# Patient Record
Sex: FEMALE | Race: WHITE | NOT HISPANIC OR LATINO
[De-identification: names, ages, dates, MRNs, and addresses within clinical notes are randomized per-mention and may not be internally consistent; named-entity substitution may affect disease eponyms.]

---

## 2021-12-28 PROBLEM — Z00.00 ENCOUNTER FOR PREVENTIVE HEALTH EXAMINATION: Status: ACTIVE | Noted: 2021-12-28

## 2021-12-30 ENCOUNTER — APPOINTMENT (OUTPATIENT)
Dept: BREAST CENTER | Facility: CLINIC | Age: 71
End: 2021-12-30
Payer: MEDICARE

## 2021-12-30 VITALS
RESPIRATION RATE: 65 BRPM | SYSTOLIC BLOOD PRESSURE: 146 MMHG | BODY MASS INDEX: 29.02 KG/M2 | DIASTOLIC BLOOD PRESSURE: 83 MMHG | HEIGHT: 64 IN | WEIGHT: 170 LBS

## 2021-12-30 DIAGNOSIS — Z72.3 LACK OF PHYSICAL EXERCISE: ICD-10-CM

## 2021-12-30 PROCEDURE — 99203 OFFICE O/P NEW LOW 30 MIN: CPT

## 2021-12-30 RX ORDER — HYDROCHLOROTHIAZIDE 25 MG/1
25 TABLET ORAL
Refills: 0 | Status: ACTIVE | COMMUNITY

## 2021-12-30 RX ORDER — METOPROLOL SUCCINATE 50 MG/1
50 TABLET, EXTENDED RELEASE ORAL
Refills: 0 | Status: ACTIVE | COMMUNITY

## 2021-12-30 RX ORDER — ATORVASTATIN CALCIUM 20 MG/1
20 TABLET, FILM COATED ORAL
Refills: 0 | Status: ACTIVE | COMMUNITY

## 2021-12-30 NOTE — PAST MEDICAL HISTORY
[Menarche Age ____] : age at menarche was [unfilled] [Menopause Age____] : age at menopause was [unfilled] [Total Preg ___] : G[unfilled] [Live Births ___] : P[unfilled]  [Living ___] : Living: [unfilled] [Age At Live Birth ___] : Age at live birth: [unfilled] [FreeTextEntry7] : No [FreeTextEntry8] : Yes

## 2021-12-30 NOTE — HISTORY OF PRESENT ILLNESS
[FreeTextEntry1] : Pt is a 71 year old Malaysian female referred for consultation by Dr. Isaiah Brown for abnormal mmg.\par Pt denies any breast lesions, discharge or masses.\par Here w/ her Daughter - Niya\par Pt is Malaysian. \par \par 12/14/21, Geoff SALGADO sMMG: het dense, cluster of microcalcs, ? branching, approx 12:00 position middle or posterior depth, rec add imaging, BR0\par 12/23/21, HUMA SALGADO dMMG: het dense, localizable to approx 12:00 mid depth calcs susp for malignancy, bx rec, BR4\par \par Fhx: Mom w/ ? reproductive Ca - poss ovarian?, 2 mCousins w/ Ovarian Ca, and Father w/ Leukemia at 90. \par

## 2021-12-30 NOTE — ASSESSMENT
[FreeTextEntry1] : Pt is a 71 year old Citizen of [place] female referred for consultation by Dr. Isaiah Brown for abnormal mmg.\par Pt denies any breast lesions, discharge or masses.\par Here w/ her Daughter - Niya\par Pt is Citizen of [place]. \par \par 12/14/21, NAOMI, Bilat sMMG: het dense, cluster of microcalcs, ? branching, approx 12:00 position middle or posterior depth, rec add imaging, BR0\par 12/23/21, NAOMI, R dMMG: het dense, localizable to approx 12:00 mid depth calcs susp for malignancy, bx rec, BR4\par \par Fhx: Mom w/ ? reproductive Ca - poss ovarian?, 2 mCousins w/ Ovarian Ca, and Father w/ Leukemia at 90. \par CBE: D cup, pendulous breasts. Left UOQ diffusely denser then on right (may be dense breast tissue but will get u/s). No axillary or SC lymphadenopathy.\par Reviewed results of mmg, rec is for review and probably stereobx. Also rec bilat u/s given dense breasts and UOQ left diffusely denser. Possible MRI and genetic testing pending results. Pt to clarify fhx. \par

## 2021-12-30 NOTE — PHYSICAL EXAM
[Normocephalic] : normocephalic [Atraumatic] : atraumatic [Supple] : supple [No Supraclavicular Adenopathy] : no supraclavicular adenopathy [No Thyromegaly] : no thyromegaly [Examined in the supine and seated position] : examined in the supine and seated position [Bra Size: ___] : Bra Size: [unfilled] [No dominant masses] : no dominant masses in right breast  [No dominant masses] : no dominant masses left breast [No Nipple Retraction] : no left nipple retraction [No Nipple Discharge] : no left nipple discharge [No Axillary Lymphadenopathy] : no left axillary lymphadenopathy [No Edema] : no edema [No Swelling] : no swelling [Full ROM] : full range of motion [No Rashes] : no rashes [No Ulceration] : no ulceration [Grade 3] : Ptosis Grade 3 [de-identified] : Grooves on shoulders

## 2021-12-30 NOTE — DATA REVIEWED
[FreeTextEntry1] : 12/14/21, Geoff SALGADO sMMG: het dense, cluster of microcalcs, ? branching, approx 12:00 position middle or posterior depth, rec add imaging, BR0\par 12/23/21, HUMA SALGADO dMMG: het dense, localizable to approx 12:00 mid depth calcs susp for malignancy, bx rec, BR4

## 2021-12-30 NOTE — CONSULT LETTER
[Dear  ___] : Dear  [unfilled], [Consult Letter:] : I had the pleasure of evaluating your patient, [unfilled]. [Please see my note below.] : Please see my note below. [Consult Closing:] : Thank you very much for allowing me to participate in the care of this patient.  If you have any questions, please do not hesitate to contact me. [Sincerely,] : Sincerely, [FreeTextEntry3] : Elizabet Howard MD

## 2022-01-05 ENCOUNTER — NON-APPOINTMENT (OUTPATIENT)
Age: 72
End: 2022-01-05

## 2022-01-06 ENCOUNTER — RESULT REVIEW (OUTPATIENT)
Age: 72
End: 2022-01-06

## 2022-01-06 ENCOUNTER — APPOINTMENT (OUTPATIENT)
Dept: ULTRASOUND IMAGING | Facility: CLINIC | Age: 72
End: 2022-01-06
Payer: MEDICARE

## 2022-01-06 PROCEDURE — 76641 ULTRASOUND BREAST COMPLETE: CPT | Mod: 50

## 2022-01-18 ENCOUNTER — APPOINTMENT (OUTPATIENT)
Dept: MAMMOGRAPHY | Facility: CLINIC | Age: 72
End: 2022-01-18
Payer: MEDICARE

## 2022-01-18 ENCOUNTER — RESULT REVIEW (OUTPATIENT)
Age: 72
End: 2022-01-18

## 2022-01-18 ENCOUNTER — OUTPATIENT (OUTPATIENT)
Dept: OUTPATIENT SERVICES | Facility: HOSPITAL | Age: 72
LOS: 1 days | End: 2022-01-18
Payer: MEDICARE

## 2022-01-18 DIAGNOSIS — R92.8 OTHER ABNORMAL AND INCONCLUSIVE FINDINGS ON DIAGNOSTIC IMAGING OF BREAST: ICD-10-CM

## 2022-01-18 DIAGNOSIS — R92.2 INCONCLUSIVE MAMMOGRAM: ICD-10-CM

## 2022-01-18 PROCEDURE — 19081 BX BREAST 1ST LESION STRTCTC: CPT | Mod: RT

## 2022-01-18 PROCEDURE — 77065 DX MAMMO INCL CAD UNI: CPT

## 2022-01-18 PROCEDURE — A4648: CPT

## 2022-01-18 PROCEDURE — 77065 DX MAMMO INCL CAD UNI: CPT | Mod: 26,RT

## 2022-01-18 PROCEDURE — 19081 BX BREAST 1ST LESION STRTCTC: CPT

## 2022-01-25 ENCOUNTER — APPOINTMENT (OUTPATIENT)
Dept: BREAST CENTER | Facility: CLINIC | Age: 72
End: 2022-01-25
Payer: MEDICARE

## 2022-01-25 VITALS
BODY MASS INDEX: 29.35 KG/M2 | WEIGHT: 171 LBS | DIASTOLIC BLOOD PRESSURE: 78 MMHG | OXYGEN SATURATION: 97 % | HEART RATE: 64 BPM | SYSTOLIC BLOOD PRESSURE: 120 MMHG | TEMPERATURE: 97.1 F | RESPIRATION RATE: 14 BRPM

## 2022-01-25 VITALS
RESPIRATION RATE: 14 BRPM | BODY MASS INDEX: 29.35 KG/M2 | TEMPERATURE: 97.1 F | WEIGHT: 171 LBS | SYSTOLIC BLOOD PRESSURE: 120 MMHG | DIASTOLIC BLOOD PRESSURE: 78 MMHG | HEART RATE: 64 BPM | OXYGEN SATURATION: 97 %

## 2022-01-25 PROCEDURE — 99214 OFFICE O/P EST MOD 30 MIN: CPT

## 2022-01-25 NOTE — DATA REVIEWED
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

## 2022-01-25 NOTE — ASSESSMENT
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 \par Referred by Dr. Isaiah Brown\par \par 12/14/21, Geoff SALGADO sMMG: het dense, cluster of microcalcs, ? 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 reproductive Ca - poss ovarian?, 2 mCousins w/ Ovarian Ca, and Father w/ Leukemia at 90. \par CBE: D cup, pendulous breasts. Right 12:00 post bx changes. Left UOQ diffusely denser then on right (may be dense breast tissue but will get u/s). No axillary or SC lymphadenopathy.\par Reviewed results of u/s and biopsy. DCIS, receptors are pending. PT with dense breasts, rec is for MRI.. Reviewed with patient would rec breast surgery, mastectomy vs lumpectomy pending no other findings on MRI.  Also radiation rec pending surgery chosen. Also mentioned hormonal tx pending receptors. SLN if pt opts for mastectomy.  Would rec further discussion after the MRI and also with daughter present. Pt will bring to f/u appt.   Also eligible for genetic testing. PT will get MRI and stop at [facility] for the genetic testing. \par

## 2022-01-25 NOTE — CONSULT LETTER
[Dear  ___] : Dear  [unfilled], [Courtesy Letter:] : I had the pleasure of seeing your patient, [unfilled], in my office today. [Please see my note below.] : Please see my note below. [Consult Closing:] : Thank you very much for allowing me to participate in the care of this patient.  If you have any questions, please do not hesitate to contact me. [Sincerely,] : Sincerely, [FreeTextEntry3] : Elizabet Howard MD

## 2022-01-25 NOTE — DATA REVIEWED
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

## 2022-01-25 NOTE — ASSESSMENT
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 \par Referred by Dr. Isaiah Brown\par \par 12/14/21, Geoff SALGADO sMMG: het dense, cluster of microcalcs, ? 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 reproductive Ca - poss ovarian?, 2 mCousins w/ Ovarian Ca, and Father w/ Leukemia at 90. \par CBE: D cup, pendulous breasts. Right 12:00 post bx changes. Left UOQ diffusely denser then on right (may be dense breast tissue but will get u/s). No axillary or SC lymphadenopathy.\par Reviewed results of u/s and biopsy. DCIS, receptors are pending. PT with dense breasts, rec is for MRI.. Reviewed with patient would rec breast surgery, mastectomy vs lumpectomy pending no other findings on MRI.  Also radiation rec pending surgery chosen. Also mentioned hormonal tx pending receptors. SLN if pt opts for mastectomy.  Would rec further discussion after the MRI and also with daughter present. Pt will bring to f/u appt.   Also eligible for genetic testing. PT will get MRI and stop at [facility] for the genetic testing. \par

## 2022-01-25 NOTE — HISTORY OF PRESENT ILLNESS
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 \par Referred by Dr. Isaiah Brown\par \par 12/14/21, Geoff SALGADO sMMG: het dense, cluster of microcalcs, ? 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 reproductive Ca - poss ovarian?, 2 mCousins w/ Ovarian Ca, and Father w/ Leukemia at 90. \par \par

## 2022-02-01 ENCOUNTER — NON-APPOINTMENT (OUTPATIENT)
Age: 72
End: 2022-02-01

## 2022-02-07 ENCOUNTER — NON-APPOINTMENT (OUTPATIENT)
Age: 72
End: 2022-02-07

## 2022-02-09 ENCOUNTER — APPOINTMENT (OUTPATIENT)
Dept: MRI IMAGING | Facility: CLINIC | Age: 72
End: 2022-02-09
Payer: MEDICARE

## 2022-02-09 PROCEDURE — 77049 MRI BREAST C-+ W/CAD BI: CPT

## 2022-02-09 PROCEDURE — A9585: CPT

## 2022-02-14 ENCOUNTER — APPOINTMENT (OUTPATIENT)
Age: 72
End: 2022-02-14
Payer: MEDICARE

## 2022-02-14 VITALS
DIASTOLIC BLOOD PRESSURE: 83 MMHG | SYSTOLIC BLOOD PRESSURE: 147 MMHG | HEART RATE: 58 BPM | WEIGHT: 171 LBS | BODY MASS INDEX: 29.19 KG/M2 | TEMPERATURE: 97.3 F | HEIGHT: 64 IN

## 2022-02-14 PROCEDURE — 99214 OFFICE O/P EST MOD 30 MIN: CPT

## 2022-02-14 NOTE — PHYSICAL EXAM
[Normocephalic] : normocephalic [Atraumatic] : atraumatic [Supple] : supple [No Supraclavicular Adenopathy] : no supraclavicular adenopathy [Examined in the supine and seated position] : examined in the supine and seated position [Symmetrical] : symmetrical [Bra Size: ___] : Bra Size: [unfilled] [No dominant masses] : no dominant masses in right breast  [No dominant masses] : no dominant masses left breast [No Nipple Retraction] : no left nipple retraction [No Nipple Discharge] : no left nipple discharge [No Axillary Lymphadenopathy] : no left axillary lymphadenopathy [No Edema] : no edema [No Rashes] : no rashes [No Ulceration] : no ulceration

## 2022-02-14 NOTE — ASSESSMENT
[FreeTextEntry1] : Pt is a 72 year old Venezuelan female here for f/u after recent R stereo bx showed DCIS, ER/[place]- for abnormal mmg.\par Pt denies any breast lesions, discharge or masses.\par Pt is [place].  Pt is hear with her , Enio. Her daughter, Humaira, was supposed to come but she got a flat tire. \par Referred by Dr. Isaiah Brown\par \par 12/14/21, Geoff SALGADO sMMG: het dense, cluster of microcalcs, ? 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 reproductive Ca - poss ovarian?, 2 mCousins w/ Ovarian Ca, and Father w/ Leukemia at 90. \par \par CBE: D cup, pendulous breasts. Right 12:00 post bx scar. Left UOQ diffusely denser then on right (U/s and MRI were negative). No axillary or SC lymphadenopathy.\par Reviewed MRI results with pt, no evidence of additional disease. DCIS, Gr 3, HR negative, measures approximately 1 cm. \par Discussion with patient and , Enio regarding the options of mastectomy vs lumpectomy with the pros and cons for both.  \par With mastectomy, options reconstruction discussed and referral to plastic surgeon offered.  Will need a drain.  Also discussed usually no radiation with mastectomy but may need if positive margins or LNs.  Discussed usually no reexcision with mastectomy. Also will need  SLN biopsy given mastectomy or magtrace injection. \par With lumpectomy, will probably need radiation and treatment was reviewed.  Will need localization, wire or magseed.  Also discussed will need negative margins and if too close or positive, may need reexcision/ additional surgery. She is a candidate for oncoplastic surgery and discussed this option but declines.  Will not need SLNBx with lumpectomy but if microinvasion or invasion found on final path, may need delayed SLNbx or axillary surgery.\par Discussed probably will not  need hormonal therapy.  No chemotherapy for Stage 0, DCIS. \par \par Pt would like to proceed with Right localized lumpectomy. Reviewed procedure, including risks and complications: bleeding, infection, anesthesia, etc. Will schedule for 2/24/22.  Does not want to wait for results of genetic testing if not back prior to surgery. \par Genetic testing today.

## 2022-02-14 NOTE — DATA REVIEWED
[FreeTextEntry1] : 2/9/22, NFR, MRI: R signal void 12:00 tissue marker placed at bx, trace adjacent enhancement to tissue marker w linear faint enhancement extending posteriorly from tissue marker approx 10mm, no add sites of susp enhancement, L scattered enhancing nonspecific foci, no susp enhancement, no lymphadenopathy bilat, rec clinical f/u, BR6

## 2022-02-17 ENCOUNTER — OUTPATIENT (OUTPATIENT)
Dept: OUTPATIENT SERVICES | Facility: HOSPITAL | Age: 72
LOS: 1 days | End: 2022-02-17
Payer: MEDICARE

## 2022-02-17 PROCEDURE — 71046 X-RAY EXAM CHEST 2 VIEWS: CPT | Mod: 26

## 2022-02-17 PROCEDURE — 93010 ELECTROCARDIOGRAM REPORT: CPT

## 2022-02-18 DIAGNOSIS — Z01.810 ENCOUNTER FOR PREPROCEDURAL CARDIOVASCULAR EXAMINATION: ICD-10-CM

## 2022-02-18 DIAGNOSIS — D05.11 INTRADUCTAL CARCINOMA IN SITU OF RIGHT BREAST: ICD-10-CM

## 2022-02-18 DIAGNOSIS — Z01.812 ENCOUNTER FOR PREPROCEDURAL LABORATORY EXAMINATION: ICD-10-CM

## 2022-02-22 ENCOUNTER — OUTPATIENT (OUTPATIENT)
Dept: OUTPATIENT SERVICES | Facility: HOSPITAL | Age: 72
LOS: 1 days | End: 2022-02-22
Payer: MEDICARE

## 2022-02-22 DIAGNOSIS — Z71.2 PERSON CONSULTING FOR EXPLANATION OF EXAMINATION OR TEST FINDINGS: ICD-10-CM

## 2022-02-22 PROCEDURE — 88321 CONSLTJ&REPRT SLD PREP ELSWR: CPT

## 2022-02-24 ENCOUNTER — RESULT REVIEW (OUTPATIENT)
Age: 72
End: 2022-02-24

## 2022-02-24 ENCOUNTER — OUTPATIENT (OUTPATIENT)
Dept: OUTPATIENT SERVICES | Facility: HOSPITAL | Age: 72
LOS: 1 days | End: 2022-02-24
Payer: MEDICARE

## 2022-02-24 ENCOUNTER — APPOINTMENT (OUTPATIENT)
Age: 72
End: 2022-02-24

## 2022-02-24 PROCEDURE — 88307 TISSUE EXAM BY PATHOLOGIST: CPT | Mod: 26

## 2022-02-24 PROCEDURE — 19301 PARTIAL MASTECTOMY: CPT

## 2022-02-24 PROCEDURE — 19281 PERQ DEVICE BREAST 1ST IMAG: CPT

## 2022-03-03 ENCOUNTER — APPOINTMENT (OUTPATIENT)
Age: 72
End: 2022-03-03
Payer: MEDICARE

## 2022-03-03 VITALS
HEART RATE: 54 BPM | HEIGHT: 64 IN | SYSTOLIC BLOOD PRESSURE: 128 MMHG | DIASTOLIC BLOOD PRESSURE: 72 MMHG | BODY MASS INDEX: 29.19 KG/M2 | TEMPERATURE: 98.2 F | WEIGHT: 171 LBS

## 2022-03-03 DIAGNOSIS — R92.8 OTHER ABNORMAL AND INCONCLUSIVE FINDINGS ON DIAGNOSTIC IMAGING OF BREAST: ICD-10-CM

## 2022-03-03 DIAGNOSIS — Z98.890 OTHER SPECIFIED POSTPROCEDURAL STATES: ICD-10-CM

## 2022-03-03 PROCEDURE — 99024 POSTOP FOLLOW-UP VISIT: CPT
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
## 2022-03-03 NOTE — HISTORY OF PRESENT ILLNESS
[FreeTextEntry1] : Pt is a 72 year old [place]ish MYRIAD CHEK2+ female here post op 2/24/22 R wire loc lumpectomy w/ poss tissue rearrangement for R DCIS, ER/SD-.\par 2/24/22 Surgical PATH: DCIS, margins neg but <1 mm at medial. rest of the distance on margins and extent of closeness on the medial to be clarified. Dr. Donn Smiley contacted.\par \par Pt denies any breast lesions, discharge or masses.\par Pt is [place]. Pt is here with her daughter, Jocelyn, who lives in [place]. \par Referred by Dr. Isaiah Brown\par \par Pt doing well post op - no pain meds needed. \par \par 12/14/21, NAOMI Bilat sMMG: het dense, cluster of microcalcs, ? 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 reproductive Ca - poss ovarian?, 2 mCousins w/ Ovarian Ca, and Father w/ Leukemia at 90. \par

## 2022-03-06 DIAGNOSIS — I10 ESSENTIAL (PRIMARY) HYPERTENSION: ICD-10-CM

## 2022-03-06 DIAGNOSIS — D05.11 INTRADUCTAL CARCINOMA IN SITU OF RIGHT BREAST: ICD-10-CM

## 2022-03-07 ENCOUNTER — NON-APPOINTMENT (OUTPATIENT)
Age: 72
End: 2022-03-07

## 2022-03-08 ENCOUNTER — APPOINTMENT (OUTPATIENT)
Dept: BREAST CENTER | Facility: CLINIC | Age: 72
End: 2022-03-08

## 2022-05-10 ENCOUNTER — RESULT REVIEW (OUTPATIENT)
Age: 72
End: 2022-05-10

## 2022-05-10 ENCOUNTER — APPOINTMENT (OUTPATIENT)
Age: 72
End: 2022-05-10
Payer: MEDICARE

## 2022-05-10 VITALS
SYSTOLIC BLOOD PRESSURE: 133 MMHG | WEIGHT: 170 LBS | DIASTOLIC BLOOD PRESSURE: 84 MMHG | TEMPERATURE: 97.2 F | HEIGHT: 64 IN | BODY MASS INDEX: 29.02 KG/M2 | HEART RATE: 54 BPM

## 2022-05-10 PROCEDURE — 99024 POSTOP FOLLOW-UP VISIT: CPT
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
## 2022-05-23 ENCOUNTER — APPOINTMENT (OUTPATIENT)
Dept: BREAST CENTER | Facility: CLINIC | Age: 72
End: 2022-05-23

## 2022-05-23 ENCOUNTER — NON-APPOINTMENT (OUTPATIENT)
Age: 72
End: 2022-05-23
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
## 2022-11-01 ENCOUNTER — APPOINTMENT (OUTPATIENT)
Dept: MAMMOGRAPHY | Facility: CLINIC | Age: 72
End: 2022-11-01

## 2022-11-01 ENCOUNTER — RESULT REVIEW (OUTPATIENT)
Age: 72
End: 2022-11-01

## 2022-11-01 PROCEDURE — 77066 DX MAMMO INCL CAD BI: CPT

## 2022-11-01 PROCEDURE — G0279: CPT

## 2022-12-13 ENCOUNTER — APPOINTMENT (OUTPATIENT)
Dept: BREAST CENTER | Facility: CLINIC | Age: 72
End: 2022-12-13

## 2022-12-13 VITALS
BODY MASS INDEX: 28.17 KG/M2 | DIASTOLIC BLOOD PRESSURE: 71 MMHG | HEART RATE: 71 BPM | TEMPERATURE: 97.3 F | WEIGHT: 165 LBS | HEIGHT: 64 IN | SYSTOLIC BLOOD PRESSURE: 132 MMHG

## 2022-12-13 PROCEDURE — 99214 OFFICE O/P EST MOD 30 MIN: CPT

## 2022-12-13 NOTE — ASSESSMENT
[FreeTextEntry1] : Pt is a 72 year old [place] MYRIAD CHEK2+ female here for follow up. History of 2/24/22 R wire loc lumpectomy w/ poss tissue rearrangement for R DCIS, ER/ID-.\par 2/24/22 Surgical PATH: DCIS, margins neg but <1 mm at medial of 2 mm area. Rest of margins all >2 mm.\par Pt denies any breast lesions, discharge or masses. She states occasional discomfort in R breast but resolves spontaneously. \par \par Referred by Dr. Isaiah Brown\par Pt finished EBRT w/ Dr. Huang 5/2022\par \par 12/14/21, ELIH, Bilat sMMG: het dense, cluster of microcalcs, ? branching, approx 12:00 position middle or posterior depth, rec add imaging, BR0\par 12/23/21, ELIH, R dMMG: het dense, localizable to approx 12:00 mid depth calcs susp for malignancy, bx rec, BR4\par 1/6/22, Geoff Marsh U/S: R no susp solid mass, 10mm cyst UOQ, L no susp solid mass, prelim images question region of altered echotexture in 9:00 position not reproduced on repeat U/S, f/u u/s 6 mos rec, BR3\par 1/18/22, HUMA Anthony stereo bx grouped calcs 12:00 PATH: DCIS solid pattern w/ high grade nuclear atypia and comedonecrosis, ER/ID-, concordant, rec surg/onc mgmt\par 2/9/22, NFR, MRI: R signal void 12:00 tissue marker placed at bx, trace adjacent enhancement to tissue marker w linear faint enhancement extending posteriorly from tissue marker approx 10mm, no add sites of susp enhancement, L scattered enhancing nonspecific foci, no susp enhancement, no lymphadenopathy bilat, rec clinical f/u, BR6\par 11/1/22 NFR, bilat dMMG: FG density. Mid posterior 12:00 R breast postsurg change. No susp mass, microcalcs, or other sign of malignancy is identified. Rec mmg in 1 year. BR2\par \par Fhx: Mom w/ ? reproductive Ca - poss ovarian?, 2 mCousins w/ Ovarian Ca, and Father w/ Leukemia at 90. \par \par CBE: R UOQ incision well healed. 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 Number provided for [facility] team, patient will think about counseling and call if she desires. She states she has a colonoscopy scheduled. Will obtain yearly breast MRI given elevated risk. MRI for 5/23. MMG due 11/23.

## 2022-12-13 NOTE — DATA REVIEWED
[FreeTextEntry1] : 11/1/22 NFR, bilat dMMG: FG density. Mid posterior 12:00 R breast postsurg change. No susp mass, microcalcs, or other sign of malignancy is identified. Rec mmg in 1 year. BR2

## 2022-12-13 NOTE — PHYSICAL EXAM
[Normocephalic] : normocephalic [Atraumatic] : atraumatic [Supple] : supple [No Supraclavicular Adenopathy] : no supraclavicular adenopathy [Examined in the supine and seated position] : examined in the supine and seated position [No dominant masses] : no dominant masses in right breast  [No dominant masses] : no dominant masses left breast [No Nipple Retraction] : no left nipple retraction [No Nipple Discharge] : no left nipple discharge [No Axillary Lymphadenopathy] : no left axillary lymphadenopathy [No Edema] : no edema [No Rashes] : no rashes [No Ulceration] : no ulceration [de-identified] : UOQ scar well healed.

## 2022-12-13 NOTE — HISTORY OF PRESENT ILLNESS
[FreeTextEntry1] : Pt is a 72 year old [place] MYRIAD CHEK2+ female here for follow up. History of 2/24/22 R wire loc lumpectomy w/ poss tissue rearrangement for R DCIS, ER/[place]-.\par 2/24/22 Surgical PATH: DCIS, margins neg but <1 mm at medial of 2 mm area. Rest of margins all >2 mm.\par Pt denies any breast lesions, discharge or masses. She states occasional discomfort in R breast but resolves without intervention. \par She never spoke with genetic counselor, it had been discussed with her and daughter, Jocelyn on 3/3/22 appt. \par \par Referred by Dr. Isaiah Brown\par Pt finished EBRT w/ Dr. Huang 5/2022. After discussion with Dr. Huang patient opted to proceed with radiation with boost instead reexcision. \par \par 12/14/21, ELIH, Bilat sMMG: het dense, cluster of microcalcs, ? branching, approx 12:00 position middle or posterior depth, rec add imaging, BR0\par 12/23/21, ELIH, R dMMG: het dense, localizable to approx 12:00 mid depth calcs susp for malignancy, bx rec, BR4\par 1/6/22, Geoff Marsh U/S: R no susp solid mass, 10mm cyst UOQ, L no susp solid mass, prelim images question region of altered echotexture in 9:00 position not reproduced on repeat U/S, f/u u/s 6 mos rec, BR3\par 1/18/22, HUMA Anthony stereo bx grouped calcs 12:00 PATH: DCIS solid pattern w/ high grade nuclear atypia and comedonecrosis, ER/[place]-, concordant, rec surg/onc mgmt\par 2/9/22, NFR, MRI: R signal void 12:00 tissue marker placed at bx, trace adjacent enhancement to tissue marker w linear faint enhancement extending posteriorly from tissue marker approx 10mm, no add sites of susp enhancement, L scattered enhancing nonspecific foci, no susp enhancement, no lymphadenopathy bilat, rec clinical f/u, BR6\par 11/1/22 NFR, bilat dMMG: FG density. Mid posterior 12:00 R breast postsurg change. No susp mass, microcalcs, or other sign of malignancy is identified. Rec mmg in 1 year. BR2\par \par Fhx: Mom w/ ? reproductive Ca - poss ovarian?, 2 mCousins w/ Ovarian Ca, and Father w/ Leukemia at 90. \par \par

## 2023-06-12 ENCOUNTER — APPOINTMENT (OUTPATIENT)
Dept: MRI IMAGING | Facility: CLINIC | Age: 73
End: 2023-06-12
Payer: MEDICARE

## 2023-06-12 PROCEDURE — 77049 MRI BREAST C-+ W/CAD BI: CPT

## 2023-06-12 PROCEDURE — A9585: CPT

## 2023-06-14 ENCOUNTER — NON-APPOINTMENT (OUTPATIENT)
Age: 73
End: 2023-06-14

## 2024-02-27 ENCOUNTER — APPOINTMENT (OUTPATIENT)
Dept: BREAST CENTER | Facility: CLINIC | Age: 74
End: 2024-02-27
Payer: MEDICARE

## 2024-02-27 VITALS
OXYGEN SATURATION: 98 % | WEIGHT: 160 LBS | SYSTOLIC BLOOD PRESSURE: 112 MMHG | HEIGHT: 64 IN | DIASTOLIC BLOOD PRESSURE: 68 MMHG | HEART RATE: 54 BPM | BODY MASS INDEX: 27.31 KG/M2

## 2024-02-27 DIAGNOSIS — R92.30 DENSE BREASTS, UNSPECIFIED: ICD-10-CM

## 2024-02-27 DIAGNOSIS — Z80.41 FAMILY HISTORY OF MALIGNANT NEOPLASM OF OVARY: ICD-10-CM

## 2024-02-27 DIAGNOSIS — Z15.09 GENETIC SUSCEPTIBILITY TO MALIGNANT NEOPLASM OF BREAST: ICD-10-CM

## 2024-02-27 DIAGNOSIS — Z78.9 OTHER SPECIFIED HEALTH STATUS: ICD-10-CM

## 2024-02-27 DIAGNOSIS — Z15.89 GENETIC SUSCEPTIBILITY TO MALIGNANT NEOPLASM OF BREAST: ICD-10-CM

## 2024-02-27 DIAGNOSIS — Z15.02 GENETIC SUSCEPTIBILITY TO MALIGNANT NEOPLASM OF BREAST: ICD-10-CM

## 2024-02-27 DIAGNOSIS — Z15.01 GENETIC SUSCEPTIBILITY TO MALIGNANT NEOPLASM OF BREAST: ICD-10-CM

## 2024-02-27 DIAGNOSIS — D05.11 INTRADUCTAL CARCINOMA IN SITU OF RIGHT BREAST: ICD-10-CM

## 2024-02-27 PROCEDURE — 99214 OFFICE O/P EST MOD 30 MIN: CPT

## 2024-02-27 NOTE — CONSULT LETTER
[Dear  ___] : Dear  [unfilled], [Please see my note below.] : Please see my note below. [Consult Closing:] : Thank you very much for allowing me to participate in the care of this patient.  If you have any questions, please do not hesitate to contact me. [Courtesy Letter:] : I had the pleasure of seeing your patient, [unfilled], in my office today. [Sincerely,] : Sincerely, [FreeTextEntry3] : Elizabet Howard MD

## 2024-02-27 NOTE — PHYSICAL EXAM
[Normocephalic] : normocephalic [Atraumatic] : atraumatic [Supple] : supple [No Supraclavicular Adenopathy] : no supraclavicular adenopathy [No Thyromegaly] : no thyromegaly [Examined in the supine and seated position] : examined in the supine and seated position [Asymmetrical] : asymmetrical [No dominant masses] : no dominant masses left breast [No dominant masses] : no dominant masses in right breast  [No Nipple Retraction] : no right nipple retraction [No Nipple Discharge] : no left nipple discharge [No Axillary Lymphadenopathy] : no left axillary lymphadenopathy [No Edema] : no edema [No Swelling] : no swelling [Full ROM] : full range of motion [No Rashes] : no rashes [No Ulceration] : no ulceration [de-identified] : : R UOQ incision well healed. Post EBRT changes, No suspicious findings. Left: negative. No axillary or SC lymphadenopathy.

## 2024-02-27 NOTE — HISTORY OF PRESENT ILLNESS
[FreeTextEntry1] : PCP: Dr. Isaiah Brown  Pt is a 74 year old [place] MYRIAD CHEK2+ female here for follow up. History of 2/24/22 R wire loc lumpectomy w/ poss tissue rearrangement for R DCIS, ER/LA-. 2/24/22 Surgical PATH: DCIS, margins neg but <1 mm at medial of 2 mm area. Rest of margins all >2 mm.  Here today with her daughter Jocelyn. Pt denies any breast lesions, discharge or masses. She states occasional discomfort in R breast but resolves spontaneously. Pt finished EBRT w/ Dr. Huang 5/2022, no longer following with him.  She has undergone genetic counseling previously.   12/14/21, Geoff SALGADO sMMG: het dense, cluster of microcalcs, ? branching, approx 12:00 position middle or posterior depth, rec add imaging, BR0  12/23/21, HUMA SALGADO dMMG: het dense, localizable to approx 12:00 mid depth calcs susp for malignancy, bx rec, BR4  1/6/22, Geoff Marsh U/S: R no susp solid mass, 10mm cyst UOQ, L no susp solid mass, prelim images question region of altered echotexture in 9:00 position not reproduced on repeat U/S, f/u u/s 6 mos rec, BR3  1/18/22, Gabrielle, HUMA stereo bx grouped calcs 12:00 PATH: DCIS solid pattern w/ high grade nuclear atypia and comedonecrosis, ER/LA-, concordant, rec surg/onc mgmt  2/9/22, NFR, MRI: R signal void 12:00 tissue marker placed at bx, trace adjacent enhancement to tissue marker w linear faint enhancement extending posteriorly from tissue marker approx 10mm, no add sites of susp enhancement, L scattered enhancing nonspecific foci, no susp enhancement, no lymphadenopathy bilat, rec clinical f/u, BR6  11/1/22 NFR, bilat dMMG: FG density. Mid posterior 12:00 R breast postsurg change. No susp mass, microcalcs, or other sign of malignancy is identified. Rec mmg in 1 year. BR2  6/12/23 Maximo, BOBO: R- 1.5x3.5cm mid 12:00 nonenhancing seroma and adjacent scarring. No susp enhancement in R breast. L- no susp enhancement. Axilla- No sign axillary or intermal mammary lymphadenopathy. Resume annual mmg on schedule. BR2   Fhx: Mom w/ ? reproductive Ca - poss ovarian?, 2 mCousins w/ Ovarian Ca, and Father w/ Leukemia at 90.

## 2024-02-27 NOTE — ASSESSMENT
[FreeTextEntry1] : PCP: Dr. Isaiah Brown  Pt is a 74 year old [place] MYRIAD CHEK2+ female here for follow up. History of 2/24/22 R wire loc lumpectomy w/ poss tissue rearrangement for R DCIS, ER/[place]-. 2/24/22 Surgical PATH: DCIS, margins neg but <1 mm at medial of 2 mm area. Rest of margins all >2 mm.  Here today with her daughter Jocelyn. Pt denies any breast lesions, discharge or masses. She states occasional discomfort in R breast but resolves spontaneously. Pt finished EBRT w/ Dr. Huang 5/2022, no longer following with him.  She has undergone genetic counseling previously.   12/14/21, Geoff SALGADO sMMG: het dense, cluster of microcalcs, ? branching, approx 12:00 position middle or posterior depth, rec add imaging, BR0  12/23/21, HUMA SALGADO dMMG: het dense, localizable to approx 12:00 mid depth calcs susp for malignancy, bx rec, BR4  1/6/22, Geoff Marsh U/S: R no susp solid mass, 10mm cyst UOQ, L no susp solid mass, prelim images question region of altered echotexture in 9:00 position not reproduced on repeat U/S, f/u u/s 6 mos rec, BR3  1/18/22, Gabrielle, HUMA stereo bx grouped calcs 12:00 PATH: DCIS solid pattern w/ high grade nuclear atypia and comedonecrosis, ER/[place]-, concordant, rec surg/onc mgmt  2/9/22, NFR, MRI: R signal void 12:00 tissue marker placed at bx, trace adjacent enhancement to tissue marker w linear faint enhancement extending posteriorly from tissue marker approx 10mm, no add sites of susp enhancement, L scattered enhancing nonspecific foci, no susp enhancement, no lymphadenopathy bilat, rec clinical f/u, BR6  11/1/22 NFR, bilat dMMG: FG density. Mid posterior 12:00 R breast postsurg change. No susp mass, microcalcs, or other sign of malignancy is identified. Rec mmg in 1 year. BR2  6/12/23 Maximo, BOBO: R- 1.5x3.5cm mid 12:00 nonenhancing seroma and adjacent scarring. No susp enhancement in R breast. L- no susp enhancement. Axilla- No sign axillary or intermal mammary lymphadenopathy. Resume annual mmg on schedule. BR2   Fhx: Mom w/ ? reproductive Ca - poss ovarian?, 2 mCousins w/ Ovarian Ca, and Father w/ Leukemia at 90.  CBE: R UOQ incision well healed. Post EBRT changes, No suspicious findings. Left: negative. No axillary or SC lymphadenopathy.   CBE is HILARY, reviewed with pt and daughter. Pt is due for bilat mmg.  She had MRI 6/12/23 and BR2, would prefer not to have another sMRI. Daughter, Pat states genetic counseling was done and they "are all good."  PLAN:  Bilat mmg, if negative, f.u 6 mos. (Pt going to [place] so may be closer to 8 mos) Pt is 2 years out.

## 2024-02-27 NOTE — DATA REVIEWED
[FreeTextEntry1] : 6/12/23 Maximo, MRI: R- 1.5x3.5cm mid 12:00 nonenhancing seroma and adjacent scarring. No susp enhancement in R breast. L- no susp enhancement. Axilla- No sign axillary or intermal mammary lymphadenopathy. Resume annual mmg on schedule. BR2

## 2024-03-21 ENCOUNTER — RESULT REVIEW (OUTPATIENT)
Age: 74
End: 2024-03-21

## 2024-03-21 ENCOUNTER — APPOINTMENT (OUTPATIENT)
Dept: MAMMOGRAPHY | Facility: CLINIC | Age: 74
End: 2024-03-21
Payer: MEDICARE

## 2024-03-21 PROCEDURE — G0279: CPT

## 2024-03-21 PROCEDURE — 77066 DX MAMMO INCL CAD BI: CPT

## 2024-03-25 ENCOUNTER — NON-APPOINTMENT (OUTPATIENT)
Age: 74
End: 2024-03-25

## 2025-01-06 ENCOUNTER — APPOINTMENT (OUTPATIENT)
Dept: BREAST CENTER | Facility: CLINIC | Age: 75
End: 2025-01-06

## 2025-01-06 DIAGNOSIS — D05.11 INTRADUCTAL CARCINOMA IN SITU OF RIGHT BREAST: ICD-10-CM

## 2025-01-06 DIAGNOSIS — Z78.9 OTHER SPECIFIED HEALTH STATUS: ICD-10-CM

## 2025-01-06 DIAGNOSIS — Z98.890 OTHER SPECIFIED POSTPROCEDURAL STATES: ICD-10-CM

## 2025-01-28 ENCOUNTER — APPOINTMENT (OUTPATIENT)
Dept: BREAST CENTER | Facility: CLINIC | Age: 75
End: 2025-01-28
Payer: MEDICARE

## 2025-01-28 VITALS
SYSTOLIC BLOOD PRESSURE: 150 MMHG | DIASTOLIC BLOOD PRESSURE: 80 MMHG | TEMPERATURE: 97.2 F | HEIGHT: 64 IN | BODY MASS INDEX: 26.8 KG/M2 | WEIGHT: 157 LBS

## 2025-01-28 DIAGNOSIS — Z15.89 GENETIC SUSCEPTIBILITY TO MALIGNANT NEOPLASM OF BREAST: ICD-10-CM

## 2025-01-28 DIAGNOSIS — Z15.09 GENETIC SUSCEPTIBILITY TO MALIGNANT NEOPLASM OF BREAST: ICD-10-CM

## 2025-01-28 DIAGNOSIS — D05.11 INTRADUCTAL CARCINOMA IN SITU OF RIGHT BREAST: ICD-10-CM

## 2025-01-28 DIAGNOSIS — Z80.41 FAMILY HISTORY OF MALIGNANT NEOPLASM OF OVARY: ICD-10-CM

## 2025-01-28 DIAGNOSIS — Z15.01 GENETIC SUSCEPTIBILITY TO MALIGNANT NEOPLASM OF BREAST: ICD-10-CM

## 2025-01-28 DIAGNOSIS — Z98.890 OTHER SPECIFIED POSTPROCEDURAL STATES: ICD-10-CM

## 2025-01-28 DIAGNOSIS — Z15.02 GENETIC SUSCEPTIBILITY TO MALIGNANT NEOPLASM OF BREAST: ICD-10-CM

## 2025-01-28 PROCEDURE — 99214 OFFICE O/P EST MOD 30 MIN: CPT

## 2025-03-19 ENCOUNTER — APPOINTMENT (OUTPATIENT)
Dept: MRI IMAGING | Facility: CLINIC | Age: 75
End: 2025-03-19
Payer: MEDICARE

## 2025-03-19 PROCEDURE — 77049 MRI BREAST C-+ W/CAD BI: CPT

## 2025-03-19 PROCEDURE — A9585: CPT

## 2025-03-27 ENCOUNTER — NON-APPOINTMENT (OUTPATIENT)
Age: 75
End: 2025-03-27

## 2025-04-01 ENCOUNTER — APPOINTMENT (OUTPATIENT)
Dept: BREAST CENTER | Facility: CLINIC | Age: 75
End: 2025-04-01
Payer: MEDICARE

## 2025-04-01 VITALS
BODY MASS INDEX: 26.98 KG/M2 | TEMPERATURE: 97.6 F | SYSTOLIC BLOOD PRESSURE: 127 MMHG | DIASTOLIC BLOOD PRESSURE: 76 MMHG | HEIGHT: 64 IN | HEART RATE: 56 BPM | WEIGHT: 158 LBS | OXYGEN SATURATION: 98 %

## 2025-04-01 DIAGNOSIS — D05.11 INTRADUCTAL CARCINOMA IN SITU OF RIGHT BREAST: ICD-10-CM

## 2025-04-01 DIAGNOSIS — Z15.09 GENETIC SUSCEPTIBILITY TO MALIGNANT NEOPLASM OF BREAST: ICD-10-CM

## 2025-04-01 DIAGNOSIS — Z15.01 GENETIC SUSCEPTIBILITY TO MALIGNANT NEOPLASM OF BREAST: ICD-10-CM

## 2025-04-01 DIAGNOSIS — Z15.89 GENETIC SUSCEPTIBILITY TO MALIGNANT NEOPLASM OF BREAST: ICD-10-CM

## 2025-04-01 DIAGNOSIS — Z98.890 OTHER SPECIFIED POSTPROCEDURAL STATES: ICD-10-CM

## 2025-04-01 DIAGNOSIS — Z78.9 OTHER SPECIFIED HEALTH STATUS: ICD-10-CM

## 2025-04-01 DIAGNOSIS — Z15.02 GENETIC SUSCEPTIBILITY TO MALIGNANT NEOPLASM OF BREAST: ICD-10-CM

## 2025-04-01 PROCEDURE — 99214 OFFICE O/P EST MOD 30 MIN: CPT

## 2025-04-01 RX ORDER — MECLIZINE 25 MG/1
TABLET, CHEWABLE ORAL
Refills: 0 | Status: ACTIVE | COMMUNITY